# Patient Record
Sex: MALE | Race: WHITE
[De-identification: names, ages, dates, MRNs, and addresses within clinical notes are randomized per-mention and may not be internally consistent; named-entity substitution may affect disease eponyms.]

---

## 2023-06-05 ENCOUNTER — HOSPITAL ENCOUNTER
Age: 73
End: 2023-06-05
Payer: MEDICARE

## 2023-06-05 DIAGNOSIS — H34.12: Primary | ICD-10-CM

## 2023-06-05 PROCEDURE — 85652 RBC SED RATE AUTOMATED: CPT

## 2023-06-05 PROCEDURE — 36415 COLL VENOUS BLD VENIPUNCTURE: CPT

## 2023-06-05 PROCEDURE — 86140 C-REACTIVE PROTEIN: CPT

## 2023-06-06 ENCOUNTER — HOSPITAL ENCOUNTER
Dept: HOSPITAL 101 - PM | Age: 73
End: 2023-06-06
Payer: MEDICARE

## 2023-06-06 DIAGNOSIS — M47.816: Primary | ICD-10-CM

## 2023-06-06 DIAGNOSIS — M62.838: ICD-10-CM

## 2023-06-06 PROCEDURE — 20552 NJX 1/MLT TRIGGER POINT 1/2: CPT

## 2023-06-20 ENCOUNTER — HOSPITAL ENCOUNTER (OUTPATIENT)
Dept: HOSPITAL 101 - SURGOUT | Age: 73
Discharge: HOME | End: 2023-06-20
Payer: MEDICARE

## 2023-06-20 VITALS — OXYGEN SATURATION: 96 % | SYSTOLIC BLOOD PRESSURE: 170 MMHG | DIASTOLIC BLOOD PRESSURE: 83 MMHG | HEART RATE: 85 BPM

## 2023-06-20 VITALS — RESPIRATION RATE: 20 BRPM

## 2023-06-20 VITALS
SYSTOLIC BLOOD PRESSURE: 145 MMHG | RESPIRATION RATE: 16 BRPM | TEMPERATURE: 97.16 F | OXYGEN SATURATION: 97 % | DIASTOLIC BLOOD PRESSURE: 83 MMHG | HEART RATE: 91 BPM

## 2023-06-20 VITALS — HEART RATE: 85 BPM | OXYGEN SATURATION: 95 % | SYSTOLIC BLOOD PRESSURE: 162 MMHG | DIASTOLIC BLOOD PRESSURE: 79 MMHG

## 2023-06-20 DIAGNOSIS — M47.816: Primary | ICD-10-CM

## 2023-06-20 PROCEDURE — 64493 INJ PARAVERT F JNT L/S 1 LEV: CPT

## 2023-06-20 PROCEDURE — 64494 INJ PARAVERT F JNT L/S 2 LEV: CPT

## 2023-06-20 RX ADMIN — BUPIVACAINE HYDROCHLORIDE 8 ML: 2.5 INJECTION, SOLUTION EPIDURAL; INFILTRATION; INTRACAUDAL; PERINEURAL at 10:20

## 2023-06-22 ENCOUNTER — HOSPITAL ENCOUNTER
Age: 73
End: 2023-06-22
Payer: MEDICARE

## 2023-06-22 DIAGNOSIS — M51.36: ICD-10-CM

## 2023-06-22 DIAGNOSIS — M54.50: Primary | ICD-10-CM

## 2023-06-22 PROCEDURE — 72148 MRI LUMBAR SPINE W/O DYE: CPT

## 2023-07-11 ENCOUNTER — HOSPITAL ENCOUNTER
Dept: HOSPITAL 101 - PM | Age: 73
Discharge: HOME | End: 2023-07-11
Payer: MEDICARE

## 2023-07-11 DIAGNOSIS — M62.838: ICD-10-CM

## 2023-07-11 DIAGNOSIS — M54.50: ICD-10-CM

## 2023-07-11 DIAGNOSIS — G89.29: Primary | ICD-10-CM

## 2023-07-11 DIAGNOSIS — M48.061: ICD-10-CM

## 2023-07-11 PROCEDURE — G0463 HOSPITAL OUTPT CLINIC VISIT: HCPCS

## 2023-08-24 PROBLEM — E78.5 HYPERLIPIDEMIA: Status: ACTIVE | Noted: 2023-08-24

## 2023-08-24 PROBLEM — D64.9 ANEMIA: Status: ACTIVE | Noted: 2023-08-24

## 2023-08-24 PROBLEM — I10 HYPERTENSION: Status: ACTIVE | Noted: 2023-08-24

## 2023-08-24 PROBLEM — E66.812 CLASS 2 OBESITY WITH BODY MASS INDEX (BMI) OF 35.0 TO 35.9 IN ADULT: Status: ACTIVE | Noted: 2023-08-24

## 2023-08-24 PROBLEM — K21.9 GERD (GASTROESOPHAGEAL REFLUX DISEASE): Status: ACTIVE | Noted: 2023-08-24

## 2023-08-24 PROBLEM — I42.0 ISCHEMIC DILATED CARDIOMYOPATHY (MULTI): Status: ACTIVE | Noted: 2023-08-24

## 2023-08-24 PROBLEM — I25.5 ISCHEMIC DILATED CARDIOMYOPATHY (MULTI): Status: ACTIVE | Noted: 2023-08-24

## 2023-08-24 PROBLEM — R07.89 ATYPICAL CHEST PAIN: Status: ACTIVE | Noted: 2023-08-24

## 2023-08-24 PROBLEM — Z87.891 FORMER SMOKER: Status: ACTIVE | Noted: 2023-08-24

## 2023-08-24 PROBLEM — E66.9 CLASS 2 OBESITY WITH BODY MASS INDEX (BMI) OF 35.0 TO 35.9 IN ADULT: Status: ACTIVE | Noted: 2023-08-24

## 2023-08-24 PROBLEM — I25.10 CAD (CORONARY ARTERY DISEASE): Status: ACTIVE | Noted: 2023-08-24

## 2023-08-24 RX ORDER — POTASSIUM CHLORIDE 20 MEQ/1
1 TABLET, EXTENDED RELEASE ORAL DAILY
COMMUNITY

## 2023-08-24 RX ORDER — BUPROPION HYDROCHLORIDE 150 MG/1
1 TABLET ORAL DAILY
COMMUNITY
Start: 2021-12-19 | End: 2023-10-03 | Stop reason: ALTCHOICE

## 2023-08-24 RX ORDER — ATORVASTATIN CALCIUM 80 MG/1
1 TABLET, FILM COATED ORAL NIGHTLY
COMMUNITY

## 2023-08-24 RX ORDER — BUPRENORPHINE AND NALOXONE 8; 2 MG/1; MG/1
FILM, SOLUBLE BUCCAL; SUBLINGUAL DAILY
COMMUNITY
Start: 2021-11-30 | End: 2023-10-03 | Stop reason: ALTCHOICE

## 2023-08-24 RX ORDER — ASPIRIN 81 MG/1
1 TABLET ORAL DAILY
COMMUNITY
End: 2024-06-06 | Stop reason: WASHOUT

## 2023-08-24 RX ORDER — ATOMOXETINE 40 MG/1
1 CAPSULE ORAL DAILY
COMMUNITY
Start: 2021-12-19 | End: 2023-10-03 | Stop reason: ALTCHOICE

## 2023-08-24 RX ORDER — METOPROLOL SUCCINATE 25 MG/1
1 TABLET, EXTENDED RELEASE ORAL DAILY
COMMUNITY
Start: 2022-05-16 | End: 2024-06-06 | Stop reason: SDUPTHER

## 2023-08-24 RX ORDER — FUROSEMIDE 40 MG/1
1 TABLET ORAL DAILY
COMMUNITY

## 2023-08-24 RX ORDER — DOXYCYCLINE 100 MG/1
CAPSULE ORAL
COMMUNITY
Start: 2023-01-20 | End: 2023-10-03 | Stop reason: ALTCHOICE

## 2023-08-24 RX ORDER — CLOPIDOGREL BISULFATE 75 MG/1
1 TABLET ORAL DAILY
COMMUNITY
Start: 2021-11-02

## 2023-09-26 ENCOUNTER — HOSPITAL ENCOUNTER
Dept: HOSPITAL 101 - RAD | Age: 73
Discharge: HOME | End: 2023-09-26
Payer: MEDICARE

## 2023-09-26 DIAGNOSIS — R07.89: Primary | ICD-10-CM

## 2023-09-26 PROCEDURE — 71120 X-RAY EXAM BREASTBONE 2/>VWS: CPT

## 2023-10-03 ENCOUNTER — OFFICE VISIT (OUTPATIENT)
Dept: CARDIOLOGY | Facility: CLINIC | Age: 73
End: 2023-10-03
Payer: MEDICARE

## 2023-10-03 VITALS
WEIGHT: 209.4 LBS | HEART RATE: 52 BPM | BODY MASS INDEX: 31.74 KG/M2 | HEIGHT: 68 IN | DIASTOLIC BLOOD PRESSURE: 62 MMHG | SYSTOLIC BLOOD PRESSURE: 102 MMHG

## 2023-10-03 DIAGNOSIS — E78.5 HYPERLIPIDEMIA, UNSPECIFIED HYPERLIPIDEMIA TYPE: ICD-10-CM

## 2023-10-03 DIAGNOSIS — E66.9 CLASS 1 OBESITY WITH BODY MASS INDEX (BMI) OF 31.0 TO 31.9 IN ADULT, UNSPECIFIED OBESITY TYPE, UNSPECIFIED WHETHER SERIOUS COMORBIDITY PRESENT: ICD-10-CM

## 2023-10-03 DIAGNOSIS — I25.10 CORONARY ARTERY DISEASE INVOLVING NATIVE CORONARY ARTERY OF NATIVE HEART WITHOUT ANGINA PECTORIS: Primary | ICD-10-CM

## 2023-10-03 DIAGNOSIS — I42.0 ISCHEMIC DILATED CARDIOMYOPATHY (MULTI): ICD-10-CM

## 2023-10-03 DIAGNOSIS — I10 PRIMARY HYPERTENSION: ICD-10-CM

## 2023-10-03 DIAGNOSIS — I25.5 ISCHEMIC DILATED CARDIOMYOPATHY (MULTI): ICD-10-CM

## 2023-10-03 DIAGNOSIS — R07.89 ATYPICAL CHEST PAIN: ICD-10-CM

## 2023-10-03 DIAGNOSIS — Z87.891 FORMER SMOKER: ICD-10-CM

## 2023-10-03 DIAGNOSIS — K21.9 GASTROESOPHAGEAL REFLUX DISEASE WITHOUT ESOPHAGITIS: ICD-10-CM

## 2023-10-03 PROBLEM — E66.812 CLASS 2 OBESITY WITH BODY MASS INDEX (BMI) OF 35.0 TO 35.9 IN ADULT: Status: RESOLVED | Noted: 2023-08-24 | Resolved: 2023-10-03

## 2023-10-03 PROBLEM — E66.811 CLASS 1 OBESITY WITH BODY MASS INDEX (BMI) OF 31.0 TO 31.9 IN ADULT: Status: ACTIVE | Noted: 2023-08-24

## 2023-10-03 PROCEDURE — 3078F DIAST BP <80 MM HG: CPT | Performed by: INTERNAL MEDICINE

## 2023-10-03 PROCEDURE — 3074F SYST BP LT 130 MM HG: CPT | Performed by: INTERNAL MEDICINE

## 2023-10-03 PROCEDURE — 1159F MED LIST DOCD IN RCRD: CPT | Performed by: INTERNAL MEDICINE

## 2023-10-03 PROCEDURE — 1036F TOBACCO NON-USER: CPT | Performed by: INTERNAL MEDICINE

## 2023-10-03 PROCEDURE — 3008F BODY MASS INDEX DOCD: CPT | Performed by: INTERNAL MEDICINE

## 2023-10-03 PROCEDURE — 99214 OFFICE O/P EST MOD 30 MIN: CPT | Performed by: INTERNAL MEDICINE

## 2023-10-03 ASSESSMENT — PATIENT HEALTH QUESTIONNAIRE - PHQ9
1. LITTLE INTEREST OR PLEASURE IN DOING THINGS: NOT AT ALL
SUM OF ALL RESPONSES TO PHQ9 QUESTIONS 1 AND 2: 0
2. FEELING DOWN, DEPRESSED OR HOPELESS: NOT AT ALL

## 2023-10-03 ASSESSMENT — ENCOUNTER SYMPTOMS
BACK PAIN: 1
DIZZINESS: 1

## 2023-10-03 NOTE — PATIENT INSTRUCTIONS
Please bring all medicines, vitamins, and herbal supplements with you when you come to the office.    Prescriptions will not be filled unless you are compliant with your follow up appointments or have a follow up appointment scheduled as per instruction of your physician. Refills should be requested at the time of your visit.

## 2023-10-03 NOTE — PROGRESS NOTES
"Jluiana Esposito is a 73 y.o. male.    Chief Complaint:  Follow-up (9m)    HPI  Patient is here for follow-up continue management for coronary artery disease remote PCI, ischemic cardiomyopathy, hyperlipidemia and obesity.  Since last time I saw him he denies any cardiac complaint of chest pain, palpitation, lightheadedness, dizziness or syncope.  He was in the hospital recently and had a left eye visual loss when he was off his antiplatelet.  He was seen by neurology.  His evaluation when he was at [facility] noted and reviewed with him.  The patient is entertaining the idea of \"undergoing back surgery.  However he was told by the [facility] that he has to wait for 4 months after his recent stroke.    Assessment     1. Coronary artery disease with prior PCI to the LAD. Reviewed cardiac catheterization showed no residual stenosis  2. Ischemic cardiomyopathy with previous EF around 45%  3. Hypertension controlled  4. Obesity   5. Previous improved with Prilosec episode of recumbent chest pain highly suspicious of gastroesophageal reflux disease  6. Mild anemia followed by hematology  7.  Recent stroke manifested by patient will last when he was off his antiplatelet for pain and procedure     Plan     1. Advised the patient to over-the-counter Prilosec 20 mg once daily for 6 weeks I told him if there is no improvement to notify  2. I reviewed with him secondary prevention measures at length  3. Risk, benefit and alternative of dual antiplatelet therapy was discussed with patient he understood and agreed  4. I advised him the last time to have a repeat echo but here elected to defer testing for now  5. I reviewed his recent hospitalization record  6. We will see him back in the office in 9 months  7.  I advised him from a cardiovascular standpoint he can proceed with his back surgeon and he can hold aspirin and Plavix but he had to clarify that with his neurologist       Review of " Systems   Musculoskeletal:  Positive for back pain.   Neurological:  Positive for dizziness.   All other systems reviewed and are negative.      Objective   Constitutional:       Appearance: Healthy appearance. Not in distress.   Neck:      Vascular: No carotid bruit or JVR. JVD normal.   Pulmonary:      Effort: Pulmonary effort is normal.      Breath sounds: Normal breath sounds. No wheezing. No rhonchi. No rales.   Chest:      Chest wall: Not tender to palpatation.   Cardiovascular:      PMI at left midclavicular line. Normal rate. Regular rhythm. Normal S1. Normal S2.       Murmurs: There is no murmur.      No gallop.  No click. No rub.   Pulses:     Intact distal pulses.   Edema:     Peripheral edema absent.   Abdominal:      General: Bowel sounds are normal.      Palpations: Abdomen is soft.      Tenderness: There is no abdominal tenderness.   Musculoskeletal: Normal range of motion.         General: No tenderness. Skin:     General: Skin is warm and dry.   Neurological:      General: No focal deficit present.      Mental Status: Alert and oriented to person, place and time.         Lab Review:   not applicable    Assessment/Plan   1. Coronary artery disease involving native coronary artery of native heart without angina pectoris        2. Hyperlipidemia, unspecified hyperlipidemia type        3. Primary hypertension        4. Ischemic dilated cardiomyopathy (CMS/HCC)        5. Atypical chest pain        6. Former smoker        7. Class 1 obesity with body mass index (BMI) of 31.0 to 31.9 in adult, unspecified obesity type, unspecified whether serious comorbidity present

## 2023-10-11 ENCOUNTER — HOSPITAL ENCOUNTER
Age: 73
Discharge: HOME | End: 2023-10-11
Payer: MEDICARE

## 2023-10-11 DIAGNOSIS — M47.816: Primary | ICD-10-CM

## 2023-10-11 DIAGNOSIS — Z79.899: ICD-10-CM

## 2023-10-11 PROCEDURE — G0463 HOSPITAL OUTPT CLINIC VISIT: HCPCS

## 2023-12-18 ENCOUNTER — HOSPITAL ENCOUNTER
Dept: HOSPITAL 101 - MRI | Age: 73
Discharge: HOME | End: 2023-12-18
Payer: MEDICARE

## 2023-12-18 ENCOUNTER — HOSPITAL ENCOUNTER (EMERGENCY)
Age: 73
Discharge: HOME | End: 2023-12-18
Payer: MEDICARE

## 2023-12-18 VITALS
HEART RATE: 62 BPM | RESPIRATION RATE: 20 BRPM | TEMPERATURE: 97.88 F | DIASTOLIC BLOOD PRESSURE: 51 MMHG | OXYGEN SATURATION: 100 % | SYSTOLIC BLOOD PRESSURE: 95 MMHG

## 2023-12-18 VITALS — BODY MASS INDEX: 28.9 KG/M2

## 2023-12-18 DIAGNOSIS — I67.82: ICD-10-CM

## 2023-12-18 DIAGNOSIS — I10: ICD-10-CM

## 2023-12-18 DIAGNOSIS — E66.9: ICD-10-CM

## 2023-12-18 DIAGNOSIS — R29.90: Primary | ICD-10-CM

## 2023-12-18 DIAGNOSIS — H91.90: ICD-10-CM

## 2023-12-18 DIAGNOSIS — S80.11XA: Primary | ICD-10-CM

## 2023-12-18 DIAGNOSIS — Z79.899: ICD-10-CM

## 2023-12-18 DIAGNOSIS — I25.2: ICD-10-CM

## 2023-12-18 DIAGNOSIS — M47.816: ICD-10-CM

## 2023-12-18 DIAGNOSIS — R29.898: ICD-10-CM
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
## 2023-12-18 DIAGNOSIS — R29.90: ICD-10-CM

## 2023-12-18 DIAGNOSIS — M06.9: ICD-10-CM

## 2023-12-18 DIAGNOSIS — K75.9: ICD-10-CM

## 2023-12-18 DIAGNOSIS — Z96.649: ICD-10-CM

## 2023-12-18 DIAGNOSIS — Z79.82: ICD-10-CM

## 2023-12-18 LAB
BLOOD UREA NITROGEN: 64 MG/DL (ref 7–18)
ESTIMATED GFR (AFRICAN AMERICA: 34 (ref 60–?)
ESTIMATED GFR (NON-AFRICAN AME: 28 (ref 60–?)

## 2023-12-18 PROCEDURE — 70551 MRI BRAIN STEM W/O DYE: CPT

## 2023-12-18 PROCEDURE — 84520 ASSAY OF UREA NITROGEN: CPT

## 2023-12-18 PROCEDURE — 36415 COLL VENOUS BLD VENIPUNCTURE: CPT

## 2023-12-18 PROCEDURE — 82565 ASSAY OF CREATININE: CPT

## 2023-12-18 PROCEDURE — 99284 EMERGENCY DEPT VISIT MOD MDM: CPT

## 2023-12-18 PROCEDURE — 73590 X-RAY EXAM OF LOWER LEG: CPT

## 2023-12-18 PROCEDURE — 73610 X-RAY EXAM OF ANKLE: CPT

## 2023-12-28 ENCOUNTER — HOSPITAL ENCOUNTER
Dept: HOSPITAL 101 - MRI | Age: 73
Discharge: HOME | End: 2023-12-28
Payer: MEDICARE

## 2023-12-28 DIAGNOSIS — M54.50: Primary | ICD-10-CM

## 2023-12-28 DIAGNOSIS — R29.898: ICD-10-CM

## 2023-12-28 PROCEDURE — 72148 MRI LUMBAR SPINE W/O DYE: CPT

## 2024-01-10 ENCOUNTER — HOSPITAL ENCOUNTER
Dept: HOSPITAL 101 - PM | Age: 74
Discharge: HOME | End: 2024-01-10
Payer: MEDICARE

## 2024-01-10 DIAGNOSIS — M47.816: ICD-10-CM

## 2024-01-10 DIAGNOSIS — M79.18: ICD-10-CM

## 2024-01-10 DIAGNOSIS — Z79.01: ICD-10-CM

## 2024-01-10 DIAGNOSIS — M48.062: Primary | ICD-10-CM

## 2024-01-10 DIAGNOSIS — G89.4: ICD-10-CM

## 2024-01-10 PROCEDURE — G0463 HOSPITAL OUTPT CLINIC VISIT: HCPCS

## 2024-03-14 ENCOUNTER — HOSPITAL ENCOUNTER
Dept: HOSPITAL 101 - MRI | Age: 74
Discharge: HOME | End: 2024-03-14
Payer: MEDICARE

## 2024-03-14 DIAGNOSIS — M54.12: Primary | ICD-10-CM

## 2024-03-14 DIAGNOSIS — M50.322: ICD-10-CM

## 2024-03-14 DIAGNOSIS — M50.323: ICD-10-CM

## 2024-03-14 PROCEDURE — 72141 MRI NECK SPINE W/O DYE: CPT

## 2024-06-06 ENCOUNTER — OFFICE VISIT (OUTPATIENT)
Dept: CARDIOLOGY | Facility: CLINIC | Age: 74
End: 2024-06-06
Payer: MEDICARE

## 2024-06-06 VITALS
HEIGHT: 68 IN | BODY MASS INDEX: 28.34 KG/M2 | HEART RATE: 56 BPM | SYSTOLIC BLOOD PRESSURE: 97 MMHG | DIASTOLIC BLOOD PRESSURE: 65 MMHG | WEIGHT: 187 LBS

## 2024-06-06 DIAGNOSIS — E78.2 MIXED HYPERLIPIDEMIA: Primary | ICD-10-CM

## 2024-06-06 DIAGNOSIS — Z87.891 FORMER SMOKER: ICD-10-CM

## 2024-06-06 DIAGNOSIS — I25.10 CORONARY ARTERY DISEASE INVOLVING NATIVE CORONARY ARTERY OF NATIVE HEART WITHOUT ANGINA PECTORIS: ICD-10-CM

## 2024-06-06 DIAGNOSIS — E66.9 CLASS 1 OBESITY WITH BODY MASS INDEX (BMI) OF 31.0 TO 31.9 IN ADULT, UNSPECIFIED OBESITY TYPE, UNSPECIFIED WHETHER SERIOUS COMORBIDITY PRESENT: ICD-10-CM

## 2024-06-06 DIAGNOSIS — I42.0 ISCHEMIC DILATED CARDIOMYOPATHY (MULTI): ICD-10-CM

## 2024-06-06 DIAGNOSIS — D64.9 ANEMIA, UNSPECIFIED TYPE: ICD-10-CM

## 2024-06-06 DIAGNOSIS — R07.89 ATYPICAL CHEST PAIN: ICD-10-CM

## 2024-06-06 DIAGNOSIS — I10 PRIMARY HYPERTENSION: ICD-10-CM

## 2024-06-06 DIAGNOSIS — I25.5 ISCHEMIC DILATED CARDIOMYOPATHY (MULTI): ICD-10-CM

## 2024-06-06 PROCEDURE — 1160F RVW MEDS BY RX/DR IN RCRD: CPT | Performed by: INTERNAL MEDICINE

## 2024-06-06 PROCEDURE — 1159F MED LIST DOCD IN RCRD: CPT | Performed by: INTERNAL MEDICINE

## 2024-06-06 PROCEDURE — 3074F SYST BP LT 130 MM HG: CPT | Performed by: INTERNAL MEDICINE

## 2024-06-06 PROCEDURE — 3078F DIAST BP <80 MM HG: CPT | Performed by: INTERNAL MEDICINE

## 2024-06-06 PROCEDURE — 3008F BODY MASS INDEX DOCD: CPT | Performed by: INTERNAL MEDICINE

## 2024-06-06 PROCEDURE — 1036F TOBACCO NON-USER: CPT | Performed by: INTERNAL MEDICINE

## 2024-06-06 PROCEDURE — 99214 OFFICE O/P EST MOD 30 MIN: CPT | Performed by: INTERNAL MEDICINE

## 2024-06-06 RX ORDER — ASPIRIN 325 MG
325 TABLET ORAL DAILY
COMMUNITY

## 2024-06-06 RX ORDER — BACLOFEN 20 MG/1
20 TABLET ORAL 3 TIMES DAILY
COMMUNITY

## 2024-06-06 RX ORDER — METOPROLOL SUCCINATE 25 MG/1
12.5 TABLET, EXTENDED RELEASE ORAL DAILY
Qty: 45 TABLET | Refills: 3 | Status: SHIPPED | OUTPATIENT
Start: 2024-06-06 | End: 2025-06-06

## 2024-06-06 RX ORDER — PREGABALIN 75 MG/1
75 CAPSULE ORAL DAILY
COMMUNITY
Start: 2024-04-04

## 2024-06-06 NOTE — PROGRESS NOTES
"Subjective   Jerry Esposito is a 73 y.o. male       Chief Complaint    Follow-up          HPI      Patient is here for follow-up continue management for coronary artery disease with prior PCI to the LAD, mild ischemic cardiomyopathy, hypertension and obesity.  Since last time I saw him he denies any cardiac complaint of chest pain, palpitation, lightheadedness, dizziness or syncope.  He reports his back pain has improved.  He ended up not having a back surgery.  His only complaint is easy bruisability due to Plavix.     Assessment     1. Coronary artery disease with prior PCI to the LAD. Reviewed cardiac catheterization showed no residual stenosis  2. Ischemic cardiomyopathy with previous EF around 45%  3. Hypertension controlled but blood pressure running on the low range  4.  BMI of 28  5.  Patient had previous complaint of chest pain resolved completely with proton pump inhibitors  6. Mild anemia followed by hematology  7.  Previous stroke manifested by patient will last when he was off his antiplatelet for pain and procedure  8.  Easy bruisability due to Plavix     Plan     1. Advised the patient to reduce his metoprolol ER by half  2. I reviewed with him secondary prevention measures at length  3. Risk, benefit and alternative of antiplatelet therapy was discussed with patient he understood and agreed  4. I advised him the last time to have a repeat echo but here elected to defer testing for now  5.  Will repeat his lab work  6. We will see him back in the office in 9 months    Review of Systems   All other systems reviewed and are negative.           Vitals:    06/06/24 1303 06/06/24 1327   BP: 83/53 97/65   BP Location: Left arm Right arm   Patient Position: Sitting Sitting   Pulse: 56    Weight: 84.8 kg (187 lb)    Height: 1.727 m (5' 8\")         Objective   Physical Exam  Constitutional:       Appearance: Normal appearance.   HENT:      Nose: Nose normal.   Neck:      Vascular: No carotid bruit. "   Cardiovascular:      Rate and Rhythm: Normal rate.      Pulses: Normal pulses.      Heart sounds: Normal heart sounds.   Pulmonary:      Effort: Pulmonary effort is normal.   Abdominal:      General: Bowel sounds are normal.      Palpations: Abdomen is soft.   Musculoskeletal:         General: Normal range of motion.      Cervical back: Normal range of motion.      Right lower leg: No edema.      Left lower leg: No edema.   Skin:     General: Skin is warm and dry.   Neurological:      General: No focal deficit present.      Mental Status: He is alert.   Psychiatric:         Mood and Affect: Mood normal.         Behavior: Behavior normal.         Thought Content: Thought content normal.         Judgment: Judgment normal.         Allergies  Patient has no known allergies.     Current Medications    Current Outpatient Medications:     aspirin 325 mg tablet, Take 1 tablet (325 mg) by mouth once daily., Disp: , Rfl:     atorvastatin (Lipitor) 80 mg tablet, Take 1 tablet (80 mg) by mouth once daily at bedtime., Disp: , Rfl:     baclofen (Lioresal) 20 mg tablet, Take 1 tablet (20 mg) by mouth 3 times a day., Disp: , Rfl:     clopidogrel (Plavix) 75 mg tablet, Take 1 tablet (75 mg) by mouth once daily., Disp: , Rfl:     furosemide (Lasix) 40 mg tablet, Take 1 tablet (40 mg) by mouth once daily., Disp: , Rfl:     potassium chloride CR (Klor-Con M20) 20 mEq ER tablet, Take 1 tablet (20 mEq) by mouth once daily., Disp: , Rfl:     pregabalin (Lyrica) 75 mg capsule, Take 1 capsule (75 mg) by mouth once daily., Disp: , Rfl:     metoprolol succinate XL (Toprol-XL) 25 mg 24 hr tablet, Take 0.5 tablets (12.5 mg) by mouth once daily., Disp: 45 tablet, Rfl: 3                     Assessment/Plan   1. Mixed hyperlipidemia  Alanine Aminotransferase    Aspartate Aminotransferase    Lipid Panel    Alanine Aminotransferase    Aspartate Aminotransferase    Lipid Panel      2. Coronary artery disease involving native coronary artery of  native heart without angina pectoris  Follow Up In Cardiology    metoprolol succinate XL (Toprol-XL) 25 mg 24 hr tablet    Follow Up In Cardiology      3. Primary hypertension  Follow Up In Cardiology      4. Ischemic dilated cardiomyopathy (Multi)  Follow Up In Cardiology    metoprolol succinate XL (Toprol-XL) 25 mg 24 hr tablet    Basic Metabolic Panel    Basic Metabolic Panel      5. Class 1 obesity with body mass index (BMI) of 31.0 to 31.9 in adult, unspecified obesity type, unspecified whether serious comorbidity present        6. Anemia, unspecified type        7. Atypical chest pain        8. Former smoker                 Scribe Attestation  By signing my name below, I, Stephy MANZO LPN  , Tariqibe   attest that this documentation has been prepared under the direction and in the presence of William Pickett MD.     Provider Attestation - Scribe documentation    All medical record entries made by the Scribe were at my direction and personally dictated by me. I have reviewed the chart and agree that the record accurately reflects my personal performance of the history, physical exam, discussion and plan.

## 2024-06-06 NOTE — PATIENT INSTRUCTIONS
Please bring all medicines, vitamins, and herbal supplements with you when you come to the office.    Prescriptions will not be filled unless you are compliant with your follow up appointments or have a follow up appointment scheduled as per instruction of your physician. Refills should be requested at the time of your visit.     BMI was above normal measurement. Current weight: 84.8 kg (187 lb)  Weight change since last visit (-) denotes wt loss -22.4 lbs   Weight loss needed to achieve BMI 25: 22.9 Lbs  Weight loss needed to achieve BMI 30: -9.9 Lbs  Provided instructions on dietary changes.

## 2024-06-06 NOTE — LETTER
June 6, 2024     Liam Gardiner DO  [address] [place] [address]    Patient: Jerry Esposito   YOB: 1950   Date of Visit: 6/6/2024       Dear Dr. Liam Gardiner DO:    Thank you for referring Jerry Esposito to me for evaluation. Below are my notes for this consultation.  If you have questions, please do not hesitate to call me. I look forward to following your patient along with you.       Sincerely,     William Pickett MD      CC: No Recipients  ______________________________________________________________________________________    Subjective   Jerry Esposito is a 73 y.o. male       Chief Complaint    Follow-up          HPI      Patient is here for follow-up continue management for coronary artery disease with prior PCI to the LAD, mild ischemic cardiomyopathy, hypertension and obesity.  Since last time I saw him he denies any cardiac complaint of chest pain, palpitation, lightheadedness, dizziness or syncope.  He reports his back pain has improved.  He ended up not having a back surgery.  His only complaint is easy bruisability due to Plavix.     Assessment     1. Coronary artery disease with prior PCI to the LAD. Reviewed cardiac catheterization showed no residual stenosis  2. Ischemic cardiomyopathy with previous EF around 45%  3. Hypertension controlled but blood pressure running on the low range  4.  BMI of 28  5.  Patient had previous complaint of chest pain resolved completely with proton pump inhibitors  6. Mild anemia followed by hematology  7.  Previous stroke manifested by patient will last when he was off his antiplatelet for pain and procedure  8.  Easy bruisability due to Plavix     Plan     1. Advised the patient to reduce his metoprolol ER by half  2. I reviewed with him secondary prevention measures at length  3. Risk, benefit and alternative of antiplatelet therapy was discussed with patient he understood and agreed  4. I advised him the last time to have a repeat echo  "but here elected to defer testing for now  5.  Will repeat his lab work  6. We will see him back in the office in 9 months    Review of Systems   All other systems reviewed and are negative.           Vitals:    06/06/24 1303 06/06/24 1327   BP: 83/53 97/65   BP Location: Left arm Right arm   Patient Position: Sitting Sitting   Pulse: 56    Weight: 84.8 kg (187 lb)    Height: 1.727 m (5' 8\")         Objective   Physical Exam  Constitutional:       Appearance: Normal appearance.   HENT:      Nose: Nose normal.   Neck:      Vascular: No carotid bruit.   Cardiovascular:      Rate and Rhythm: Normal rate.      Pulses: Normal pulses.      Heart sounds: Normal heart sounds.   Pulmonary:      Effort: Pulmonary effort is normal.   Abdominal:      General: Bowel sounds are normal.      Palpations: Abdomen is soft.   Musculoskeletal:         General: Normal range of motion.      Cervical back: Normal range of motion.      Right lower leg: No edema.      Left lower leg: No edema.   Skin:     General: Skin is warm and dry.   Neurological:      General: No focal deficit present.      Mental Status: He is alert.   Psychiatric:         Mood and Affect: Mood normal.         Behavior: Behavior normal.         Thought Content: Thought content normal.         Judgment: Judgment normal.         Allergies  Patient has no known allergies.     Current Medications    Current Outpatient Medications:   •  aspirin 325 mg tablet, Take 1 tablet (325 mg) by mouth once daily., Disp: , Rfl:   •  atorvastatin (Lipitor) 80 mg tablet, Take 1 tablet (80 mg) by mouth once daily at bedtime., Disp: , Rfl:   •  baclofen (Lioresal) 20 mg tablet, Take 1 tablet (20 mg) by mouth 3 times a day., Disp: , Rfl:   •  clopidogrel (Plavix) 75 mg tablet, Take 1 tablet (75 mg) by mouth once daily., Disp: , Rfl:   •  furosemide (Lasix) 40 mg tablet, Take 1 tablet (40 mg) by mouth once daily., Disp: , Rfl:   •  potassium chloride CR (Klor-Con M20) 20 mEq ER tablet, Take " 1 tablet (20 mEq) by mouth once daily., Disp: , Rfl:   •  pregabalin (Lyrica) 75 mg capsule, Take 1 capsule (75 mg) by mouth once daily., Disp: , Rfl:   •  metoprolol succinate XL (Toprol-XL) 25 mg 24 hr tablet, Take 0.5 tablets (12.5 mg) by mouth once daily., Disp: 45 tablet, Rfl: 3                     Assessment/Plan   1. Mixed hyperlipidemia  Alanine Aminotransferase    Aspartate Aminotransferase    Lipid Panel    Alanine Aminotransferase    Aspartate Aminotransferase    Lipid Panel      2. Coronary artery disease involving native coronary artery of native heart without angina pectoris  Follow Up In Cardiology    metoprolol succinate XL (Toprol-XL) 25 mg 24 hr tablet    Follow Up In Cardiology      3. Primary hypertension  Follow Up In Cardiology      4. Ischemic dilated cardiomyopathy (Multi)  Follow Up In Cardiology    metoprolol succinate XL (Toprol-XL) 25 mg 24 hr tablet    Basic Metabolic Panel    Basic Metabolic Panel      5. Class 1 obesity with body mass index (BMI) of 31.0 to 31.9 in adult, unspecified obesity type, unspecified whether serious comorbidity present        6. Anemia, unspecified type        7. Atypical chest pain        8. Former smoker                 Scribe Attestation  By signing my name below, IStephy LPN Scribelias   attest that this documentation has been prepared under the direction and in the presence of William Pickett MD.     Provider Attestation - Scribe documentation    All medical record entries made by the Scribe were at my direction and personally dictated by me. I have reviewed the chart and agree that the record accurately reflects my personal performance of the history, physical exam, discussion and plan.

## 2024-06-21 ENCOUNTER — HOSPITAL ENCOUNTER
Dept: HOSPITAL 101 - LAB | Age: 74
Discharge: HOME | End: 2024-06-21
Payer: MEDICARE

## 2024-06-21 DIAGNOSIS — I25.10: ICD-10-CM

## 2024-06-21 DIAGNOSIS — E78.00: ICD-10-CM

## 2024-06-21 DIAGNOSIS — I12.9: ICD-10-CM

## 2024-06-21 DIAGNOSIS — N18.9: ICD-10-CM

## 2024-06-21 DIAGNOSIS — Z12.5: ICD-10-CM

## 2024-06-21 DIAGNOSIS — D51.0: Primary | ICD-10-CM

## 2024-06-21 LAB
ADD MANUAL DIFF: NO
ALANINE AMINOTRANSFERASE: 29 U/L (ref 16–63)
ALBUMIN GLOBULIN RATIO: 0.9
ALBUMIN LEVEL: 3.4 G/DL (ref 3.4–5)
ALKALINE PHOSPHATASE: 59 U/L (ref 46–116)
ANION GAP: 12.8
ASPARTATE AMINO TRANSFERASE: 22 U/L (ref 15–37)
BLOOD UREA NITROGEN: 50 MG/DL (ref 7–18)
CALCIUM: 9.3 MG/DL (ref 8.5–10.1)
CARBON DIOXIDE: 26.8 MMOL/L (ref 21–32)
CHLORIDE: 104 MMOL/L (ref 98–107)
CHOLESTEROL: 133 MG/DL (ref ?–200)
ESTIMATED GFR (AFRICAN AMERICA: 44 (ref 60–?)
ESTIMATED GFR (NON-AFRICAN AME: 36 (ref 60–?)
GLOBULIN: 3.9 G/DL
GLUCOSE: 95 MG/DL (ref 74–106)
HDL CHOLESTEROL: 74 MG/DL (ref 40–60)
HEMATOCRIT: 32.1 % (ref 42–54)
HEMOGLOBIN: 10.4 G/DL (ref 14–18)
IMMATURE GRANULOCYTES ABS AUTO: 0.04 10^3/UL (ref 0–0.03)
IMMATURE GRANULOCYTES PCT AUTO: 0.4 % (ref 0–0.5)
LYMPHOCYTES  ABSOLUTE AUTO: 2.9 10^3/UL (ref 1.2–3.8)
MCV RBC: 90.9 FL (ref 80–94)
MEAN CORPUSCULAR HEMOGLOBIN: 29.5 PG (ref 25.9–34)
MEAN CORPUSCULAR HGB CONC: 32.4 G/DL (ref 29.9–35.2)
PLATELET COUNT: 232 10^3/UL (ref 150–450)
POTASSIUM: 4.6 MMOL/L (ref 3.5–5.1)
RED BLOOD COUNT: 3.53 10^6/UL (ref 4.7–6.1)
SODIUM: 139 MMOL/L (ref 136–145)
TOTAL PROTEIN: 7.3 G/DL (ref 6.4–8.2)
TRIGLYCERIDES: 28 MG/DL (ref ?–150)
VLDL CHOLESTEROL: 5.6 MG/DL
WHITE BLOOD COUNT: 10 10^3/UL (ref 4–11)

## 2024-06-21 PROCEDURE — 80061 LIPID PANEL: CPT

## 2024-06-21 PROCEDURE — 85025 COMPLETE CBC W/AUTO DIFF WBC: CPT

## 2024-06-21 PROCEDURE — 80053 COMPREHEN METABOLIC PANEL: CPT

## 2024-06-21 PROCEDURE — 36415 COLL VENOUS BLD VENIPUNCTURE: CPT

## 2024-06-21 PROCEDURE — G0103 PSA SCREENING: HCPCS

## 2024-07-17 ENCOUNTER — HOSPITAL ENCOUNTER
Dept: HOSPITAL 101 - US | Age: 74
Discharge: HOME | End: 2024-07-17
Payer: MEDICARE

## 2024-07-17 DIAGNOSIS — N17.9: Primary | ICD-10-CM

## 2024-07-17 PROCEDURE — 76775 US EXAM ABDO BACK WALL LIM: CPT

## 2024-09-20 ENCOUNTER — HOSPITAL ENCOUNTER
Dept: HOSPITAL 101 - MRI | Age: 74
Discharge: HOME | End: 2024-09-20
Payer: MEDICARE

## 2024-09-20 DIAGNOSIS — M54.16: Primary | ICD-10-CM

## 2024-09-20 DIAGNOSIS — M51.36: ICD-10-CM

## 2024-09-20 PROCEDURE — 72148 MRI LUMBAR SPINE W/O DYE: CPT

## 2024-10-08 ENCOUNTER — TELEPHONE (OUTPATIENT)
Dept: CARDIOLOGY | Facility: CLINIC | Age: 74
End: 2024-10-08
Payer: MEDICARE

## 2024-10-08 NOTE — TELEPHONE ENCOUNTER
----- Message from William Pickett sent at 10/7/2024  8:01 PM EDT -----  Acceptable Surgical risk cardiac wise.  Patient can hold aspirin and Plavix for 1 week prior to procedure  ----- Message -----  From: Beverly Mondragon  Sent: 10/7/2024   2:46 PM EDT  To: William Pickett MD

## 2024-10-14 ENCOUNTER — TELEPHONE (OUTPATIENT)
Dept: CARDIOLOGY | Facility: CLINIC | Age: 74
End: 2024-10-14
Payer: MEDICARE

## 2024-10-14 NOTE — TELEPHONE ENCOUNTER
----- Message from William Pickett sent at 10/14/2024  9:12 AM EDT -----  Okay to proceed with surgery.  Acceptable surgical risk cardiac wise.  Can hold aspirin as requested for 7 days  ----- Message -----  From: Beverly Mondragon  Sent: 10/14/2024   8:42 AM EDT  To: William Pickett MD

## 2024-10-28 ENCOUNTER — HOSPITAL ENCOUNTER
Dept: HOSPITAL 101 - LAB | Age: 74
Discharge: HOME | End: 2024-10-28
Payer: MEDICARE

## 2024-10-28 DIAGNOSIS — N18.31: Primary | ICD-10-CM

## 2024-10-28 LAB
PROTEIN CREATININE RATIO URINE: 0.2
TOTAL PROTEIN URINE RANDOM: 10.3 MG/DL (ref ?–11.9)

## 2024-10-28 PROCEDURE — 84156 ASSAY OF PROTEIN URINE: CPT

## 2024-10-28 PROCEDURE — 82570 ASSAY OF URINE CREATININE: CPT

## 2024-11-12 ENCOUNTER — HOSPITAL ENCOUNTER
Dept: HOSPITAL 101 - RAD | Age: 74
Discharge: HOME | End: 2024-11-12
Payer: MEDICARE

## 2024-11-12 ENCOUNTER — HOSPITAL ENCOUNTER (OUTPATIENT)
Dept: HOSPITAL 101 - PT | Age: 74
LOS: 7 days | Discharge: HOME | End: 2024-11-19
Payer: MEDICARE

## 2024-11-12 DIAGNOSIS — M79.601: ICD-10-CM

## 2024-11-12 DIAGNOSIS — M79.602: ICD-10-CM

## 2024-11-12 DIAGNOSIS — M54.2: Primary | ICD-10-CM

## 2024-11-12 DIAGNOSIS — Z98.890: ICD-10-CM

## 2024-11-12 DIAGNOSIS — R51.9: ICD-10-CM

## 2024-11-12 DIAGNOSIS — Z98.890: Primary | ICD-10-CM

## 2024-11-12 PROCEDURE — 97035 APP MDLTY 1+ULTRASOUND EA 15: CPT

## 2024-11-12 PROCEDURE — 97110 THERAPEUTIC EXERCISES: CPT

## 2024-11-12 PROCEDURE — 97140 MANUAL THERAPY 1/> REGIONS: CPT

## 2024-11-12 PROCEDURE — 72040 X-RAY EXAM NECK SPINE 2-3 VW: CPT

## 2024-11-12 PROCEDURE — 97161 PT EVAL LOW COMPLEX 20 MIN: CPT

## 2024-11-21 ENCOUNTER — HOSPITAL ENCOUNTER
Dept: HOSPITAL 101 - CT | Age: 74
Discharge: HOME | End: 2024-11-21
Payer: MEDICARE

## 2024-11-21 DIAGNOSIS — T88.9XXA: ICD-10-CM

## 2024-11-21 DIAGNOSIS — M43.22: Primary | ICD-10-CM

## 2024-11-21 PROCEDURE — 72125 CT NECK SPINE W/O DYE: CPT

## 2025-01-24 ENCOUNTER — HOSPITAL ENCOUNTER (OUTPATIENT)
Dept: HOSPITAL 101 - PT | Age: 75
LOS: 39 days | Discharge: HOME | End: 2025-03-04
Payer: MEDICARE

## 2025-01-24 DIAGNOSIS — M43.22: Primary | ICD-10-CM

## 2025-01-24 PROCEDURE — 97161 PT EVAL LOW COMPLEX 20 MIN: CPT

## 2025-01-24 PROCEDURE — 97110 THERAPEUTIC EXERCISES: CPT

## 2025-01-24 PROCEDURE — 97140 MANUAL THERAPY 1/> REGIONS: CPT

## 2025-03-11 ENCOUNTER — APPOINTMENT (OUTPATIENT)
Dept: CARDIOLOGY | Facility: CLINIC | Age: 75
End: 2025-03-11
Payer: MEDICARE

## 2025-03-11 VITALS
SYSTOLIC BLOOD PRESSURE: 128 MMHG | WEIGHT: 172 LBS | DIASTOLIC BLOOD PRESSURE: 74 MMHG | HEIGHT: 68 IN | HEART RATE: 62 BPM | BODY MASS INDEX: 26.07 KG/M2

## 2025-03-11 DIAGNOSIS — I25.10 CORONARY ARTERY DISEASE INVOLVING NATIVE CORONARY ARTERY OF NATIVE HEART WITHOUT ANGINA PECTORIS: Primary | ICD-10-CM

## 2025-03-11 DIAGNOSIS — I25.5 ISCHEMIC DILATED CARDIOMYOPATHY (MULTI): ICD-10-CM

## 2025-03-11 DIAGNOSIS — Z87.891 FORMER SMOKER: ICD-10-CM

## 2025-03-11 DIAGNOSIS — I42.0 ISCHEMIC DILATED CARDIOMYOPATHY (MULTI): ICD-10-CM

## 2025-03-11 DIAGNOSIS — D64.9 ANEMIA, UNSPECIFIED TYPE: ICD-10-CM

## 2025-03-11 DIAGNOSIS — I10 PRIMARY HYPERTENSION: ICD-10-CM

## 2025-03-11 DIAGNOSIS — E78.2 MIXED HYPERLIPIDEMIA: ICD-10-CM

## 2025-03-11 DIAGNOSIS — R07.89 ATYPICAL CHEST PAIN: ICD-10-CM

## 2025-03-11 PROBLEM — E66.811 CLASS 1 OBESITY WITH BODY MASS INDEX (BMI) OF 31.0 TO 31.9 IN ADULT: Status: RESOLVED | Noted: 2023-08-24 | Resolved: 2025-03-11

## 2025-03-11 PROCEDURE — 1036F TOBACCO NON-USER: CPT | Performed by: INTERNAL MEDICINE

## 2025-03-11 PROCEDURE — 3078F DIAST BP <80 MM HG: CPT | Performed by: INTERNAL MEDICINE

## 2025-03-11 PROCEDURE — 3074F SYST BP LT 130 MM HG: CPT | Performed by: INTERNAL MEDICINE

## 2025-03-11 PROCEDURE — 1160F RVW MEDS BY RX/DR IN RCRD: CPT | Performed by: INTERNAL MEDICINE

## 2025-03-11 PROCEDURE — 1159F MED LIST DOCD IN RCRD: CPT | Performed by: INTERNAL MEDICINE

## 2025-03-11 PROCEDURE — 99214 OFFICE O/P EST MOD 30 MIN: CPT | Performed by: INTERNAL MEDICINE

## 2025-03-11 PROCEDURE — G2211 COMPLEX E/M VISIT ADD ON: HCPCS | Performed by: INTERNAL MEDICINE

## 2025-03-11 PROCEDURE — 3008F BODY MASS INDEX DOCD: CPT | Performed by: INTERNAL MEDICINE

## 2025-03-11 RX ORDER — BACLOFEN 10 MG/1
20 TABLET ORAL DAILY
COMMUNITY
Start: 2025-02-02

## 2025-03-11 NOTE — PROGRESS NOTES
"Juliana Esposito is a 74 y.o. male       Chief Complaint    Follow-up          HPI     Patient is here for follow-up to management for coronary artery disease previous PCI to the LAD, ischemic cardiomyopathy, hypertension and mild obesity.  Since last time I saw him he reports he underwent back surgery without any cardiac issues or complication.  He feels well.  He denies complaint of chest pain, palpitation, lightheadedness, dizziness or syncope.  His recent lab work noted and reviewed with him.    Assessment     1. Coronary artery disease with prior PCI to the LAD.  Repeat cardiac catheterization showed no residual stenosis  2. Ischemic cardiomyopathy with previous EF around 45%  3. Hypertension controlled   4.  BMI of 26  5.  Patient had previous complaint of chest pain resolved completely with proton pump inhibitors without any recurrence  6. Mild anemia followed by hematology  7.  Previous history of stroke  8.  Easy bruisability due to Plavix  9.  Recent back surgery with continued back pain  Plan     1. Advised the patient to continue present medical regimen  2. I reviewed with him secondary prevention measures at length  3. Risk, benefit and alternative of antiplatelet therapy was discussed with patient he understood and agreed  4. I advised him the last time to have a repeat echo but here elected to defer testing for now  5.  I reviewed his recent lab work and hospital record  6. We will see him back in the office in 1 year  Review of Systems   All other systems reviewed and are negative.           Vitals:    03/11/25 1438   BP: 128/74   BP Location: Right arm   Patient Position: Sitting   Pulse: 62   Weight: 78 kg (172 lb)   Height: 1.727 m (5' 8\")        Objective   Physical Exam  Constitutional:       Appearance: Normal appearance.   HENT:      Nose: Nose normal.   Neck:      Vascular: No carotid bruit.   Cardiovascular:      Rate and Rhythm: Normal rate.      Pulses: Normal pulses.      Heart " sounds: Normal heart sounds.   Pulmonary:      Effort: Pulmonary effort is normal.   Abdominal:      General: Bowel sounds are normal.      Palpations: Abdomen is soft.   Musculoskeletal:         General: Normal range of motion.      Cervical back: Normal range of motion.      Right lower leg: No edema.      Left lower leg: No edema.   Skin:     General: Skin is warm and dry.   Neurological:      General: No focal deficit present.      Mental Status: He is alert.   Psychiatric:         Mood and Affect: Mood normal.         Behavior: Behavior normal.         Thought Content: Thought content normal.         Judgment: Judgment normal.         Allergies  Patient has no known allergies.     Current Medications    Current Outpatient Medications:     aspirin 325 mg tablet, Take 1 tablet (325 mg) by mouth once daily., Disp: , Rfl:     atorvastatin (Lipitor) 80 mg tablet, Take 1 tablet (80 mg) by mouth once daily at bedtime., Disp: , Rfl:     baclofen (Lioresal) 10 mg tablet, Take 2 tablets (20 mg) by mouth early in the morning.., Disp: , Rfl:     clopidogrel (Plavix) 75 mg tablet, Take 1 tablet (75 mg) by mouth once daily., Disp: , Rfl:     furosemide (Lasix) 40 mg tablet, Take 1 tablet (40 mg) by mouth every other day., Disp: , Rfl:     metoprolol succinate XL (Toprol-XL) 25 mg 24 hr tablet, Take 0.5 tablets (12.5 mg) by mouth once daily., Disp: 45 tablet, Rfl: 3    potassium chloride CR (Klor-Con M20) 20 mEq ER tablet, Take 1 tablet (20 mEq) by mouth once daily., Disp: , Rfl:     pregabalin (Lyrica) 75 mg capsule, Take 1 capsule (75 mg) by mouth once daily., Disp: , Rfl:                      Assessment/Plan   1. Coronary artery disease involving native coronary artery of native heart without angina pectoris  Follow Up In Cardiology    Follow Up In Cardiology      2. Ischemic dilated cardiomyopathy (Multi)        3. Primary hypertension        4. Mixed hyperlipidemia        5. Atypical chest pain        6. Anemia,  unspecified type        7. BMI 26.0-26.9,adult        8. Former smoker                 Scribe Attestation  By signing my name below, I, Stephy MANZO LPN  , Scribe   attest that this documentation has been prepared under the direction and in the presence of William Pickett MD.     Provider Attestation - Scribe documentation    All medical record entries made by the Scribe were at my direction and personally dictated by me. I have reviewed the chart and agree that the record accurately reflects my personal performance of the history, physical exam, discussion and plan.

## 2025-03-11 NOTE — LETTER
March 11, 2025     Liam Gardiner DO  [address] [place] [address]    Patient: Jerry Esposito   YOB: 1950   Date of Visit: 3/11/2025       Dear Dr. Liam Gardiner DO:    Thank you for referring Jerry Esposito to me for evaluation. Below are my notes for this consultation.  If you have questions, please do not hesitate to call me. I look forward to following your patient along with you.       Sincerely,     William Pickett MD      CC: No Recipients  ______________________________________________________________________________________    Subjective   Jerry Esposito is a 74 y.o. male       Chief Complaint    Follow-up          HPI     Patient is here for follow-up to management for coronary artery disease previous PCI to the LAD, ischemic cardiomyopathy, hypertension and mild obesity.  Since last time I saw him he reports he underwent back surgery without any cardiac issues or complication.  He feels well.  He denies complaint of chest pain, palpitation, lightheadedness, dizziness or syncope.  His recent lab work noted and reviewed with him.    Assessment     1. Coronary artery disease with prior PCI to the LAD.  Repeat cardiac catheterization showed no residual stenosis  2. Ischemic cardiomyopathy with previous EF around 45%  3. Hypertension controlled   4.  BMI of 26  5.  Patient had previous complaint of chest pain resolved completely with proton pump inhibitors without any recurrence  6. Mild anemia followed by hematology  7.  Previous history of stroke  8.  Easy bruisability due to Plavix  9.  Recent back surgery with continued back pain  Plan     1. Advised the patient to continue present medical regimen  2. I reviewed with him secondary prevention measures at length  3. Risk, benefit and alternative of antiplatelet therapy was discussed with patient he understood and agreed  4. I advised him the last time to have a repeat echo but here elected to defer testing for now  5.  I reviewed  "his recent lab work and hospital record  6. We will see him back in the office in 1 year  Review of Systems   All other systems reviewed and are negative.           Vitals:    03/11/25 1438   BP: 128/74   BP Location: Right arm   Patient Position: Sitting   Pulse: 62   Weight: 78 kg (172 lb)   Height: 1.727 m (5' 8\")        Objective   Physical Exam  Constitutional:       Appearance: Normal appearance.   HENT:      Nose: Nose normal.   Neck:      Vascular: No carotid bruit.   Cardiovascular:      Rate and Rhythm: Normal rate.      Pulses: Normal pulses.      Heart sounds: Normal heart sounds.   Pulmonary:      Effort: Pulmonary effort is normal.   Abdominal:      General: Bowel sounds are normal.      Palpations: Abdomen is soft.   Musculoskeletal:         General: Normal range of motion.      Cervical back: Normal range of motion.      Right lower leg: No edema.      Left lower leg: No edema.   Skin:     General: Skin is warm and dry.   Neurological:      General: No focal deficit present.      Mental Status: He is alert.   Psychiatric:         Mood and Affect: Mood normal.         Behavior: Behavior normal.         Thought Content: Thought content normal.         Judgment: Judgment normal.         Allergies  Patient has no known allergies.     Current Medications    Current Outpatient Medications:   •  aspirin 325 mg tablet, Take 1 tablet (325 mg) by mouth once daily., Disp: , Rfl:   •  atorvastatin (Lipitor) 80 mg tablet, Take 1 tablet (80 mg) by mouth once daily at bedtime., Disp: , Rfl:   •  baclofen (Lioresal) 10 mg tablet, Take 2 tablets (20 mg) by mouth early in the morning.., Disp: , Rfl:   •  clopidogrel (Plavix) 75 mg tablet, Take 1 tablet (75 mg) by mouth once daily., Disp: , Rfl:   •  furosemide (Lasix) 40 mg tablet, Take 1 tablet (40 mg) by mouth every other day., Disp: , Rfl:   •  metoprolol succinate XL (Toprol-XL) 25 mg 24 hr tablet, Take 0.5 tablets (12.5 mg) by mouth once daily., Disp: 45 tablet, " Rfl: 3  •  potassium chloride CR (Klor-Con M20) 20 mEq ER tablet, Take 1 tablet (20 mEq) by mouth once daily., Disp: , Rfl:   •  pregabalin (Lyrica) 75 mg capsule, Take 1 capsule (75 mg) by mouth once daily., Disp: , Rfl:                      Assessment/Plan   1. Coronary artery disease involving native coronary artery of native heart without angina pectoris  Follow Up In Cardiology    Follow Up In Cardiology      2. Ischemic dilated cardiomyopathy (Multi)        3. Primary hypertension        4. Mixed hyperlipidemia        5. Atypical chest pain        6. Anemia, unspecified type        7. BMI 26.0-26.9,adult        8. Former smoker                 Scribe Attestation  By signing my name below, I, Stephy MANZO LPN  , Scribe   attest that this documentation has been prepared under the direction and in the presence of William Pickett MD.     Provider Attestation - Scribe documentation    All medical record entries made by the Scribe were at my direction and personally dictated by me. I have reviewed the chart and agree that the record accurately reflects my personal performance of the history, physical exam, discussion and plan.

## 2025-03-11 NOTE — PATIENT INSTRUCTIONS
Please bring all medicines, vitamins, and herbal supplements with you when you come to the office.    Prescriptions will not be filled unless you are compliant with your follow up appointments or have a follow up appointment scheduled as per instruction of your physician. Refills should be requested at the time of your visit.     BMI was above normal measurement. Current weight: 78 kg (172 lb)  Weight change since last visit (-) denotes wt loss -15 lbs   Weight loss needed to achieve BMI 25: 7.9 Lbs  Weight loss needed to achieve BMI 30: -24.9 Lbs  Provided instructions on dietary changes.

## 2026-03-11 ENCOUNTER — APPOINTMENT (OUTPATIENT)
Dept: CARDIOLOGY | Facility: CLINIC | Age: 76
End: 2026-03-11
Payer: MEDICARE